# Patient Record
Sex: FEMALE | Race: ASIAN | Employment: STUDENT | URBAN - METROPOLITAN AREA
[De-identification: names, ages, dates, MRNs, and addresses within clinical notes are randomized per-mention and may not be internally consistent; named-entity substitution may affect disease eponyms.]

---
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
## 2024-09-16 NOTE — TELEPHONE ENCOUNTER
Received consult from [facility]. Put in TB bin for review.
3 = A little assistance

## (undated) NOTE — ED AVS SNAPSHOT
Gautam Amos   MRN: [id number]    Department:  [facility] Emergency Department   Date of Visit:  1/17/2020           Disclosure     Insurance plans vary and the physician(s) referred by the ER may not be covered by your plan.  Please contact yo tell this physician (or your personal doctor if your instructions are to return to your personal doctor) about any new or lasting problems. The primary care or specialist physician will see patients referred from the [facility] Emergency Department.  Urvashi Bueno